# Patient Record
Sex: MALE | Race: BLACK OR AFRICAN AMERICAN | NOT HISPANIC OR LATINO | URBAN - METROPOLITAN AREA
[De-identification: names, ages, dates, MRNs, and addresses within clinical notes are randomized per-mention and may not be internally consistent; named-entity substitution may affect disease eponyms.]

---

## 2024-04-17 ENCOUNTER — HOSPITAL ENCOUNTER (EMERGENCY)
Facility: HOSPITAL | Age: 34
Discharge: HOME | End: 2024-04-17
Attending: EMERGENCY MEDICINE

## 2024-04-17 VITALS
WEIGHT: 159.38 LBS | HEIGHT: 71 IN | OXYGEN SATURATION: 97 % | DIASTOLIC BLOOD PRESSURE: 72 MMHG | BODY MASS INDEX: 22.31 KG/M2 | TEMPERATURE: 98 F | SYSTOLIC BLOOD PRESSURE: 105 MMHG | RESPIRATION RATE: 18 BRPM | HEART RATE: 87 BPM

## 2024-04-17 DIAGNOSIS — N30.01 ACUTE CYSTITIS WITH HEMATURIA: Primary | ICD-10-CM

## 2024-04-17 LAB
BACTERIA #/AREA URNS HPF: ABNORMAL /HPF
BILIRUB UR QL STRIP: NEGATIVE
CLARITY UR: ABNORMAL
COLOR UR: YELLOW
GLUCOSE UR QL STRIP: NEGATIVE
HGB UR QL STRIP: NEGATIVE
KETONES UR QL STRIP: NEGATIVE
LEUKOCYTE ESTERASE UR QL STRIP: ABNORMAL
MICROSCOPIC COMMENT: ABNORMAL
NITRITE UR QL STRIP: POSITIVE
PH UR STRIP: 6 [PH] (ref 5–8)
PROT UR QL STRIP: ABNORMAL
RBC #/AREA URNS HPF: 2 /HPF (ref 0–4)
SP GR UR STRIP: 1.03 (ref 1–1.03)
URN SPEC COLLECT METH UR: ABNORMAL
UROBILINOGEN UR STRIP-ACNC: ABNORMAL EU/DL
WBC #/AREA URNS HPF: >100 /HPF (ref 0–5)
WBC CLUMPS URNS QL MICRO: ABNORMAL

## 2024-04-17 PROCEDURE — 87491 CHLMYD TRACH DNA AMP PROBE: CPT | Performed by: EMERGENCY MEDICINE

## 2024-04-17 PROCEDURE — 81000 URINALYSIS NONAUTO W/SCOPE: CPT | Performed by: EMERGENCY MEDICINE

## 2024-04-17 PROCEDURE — 87086 URINE CULTURE/COLONY COUNT: CPT | Performed by: EMERGENCY MEDICINE

## 2024-04-17 PROCEDURE — 87186 SC STD MICRODIL/AGAR DIL: CPT | Performed by: EMERGENCY MEDICINE

## 2024-04-17 PROCEDURE — 99283 EMERGENCY DEPT VISIT LOW MDM: CPT

## 2024-04-17 PROCEDURE — 87077 CULTURE AEROBIC IDENTIFY: CPT | Performed by: EMERGENCY MEDICINE

## 2024-04-17 PROCEDURE — 87088 URINE BACTERIA CULTURE: CPT | Performed by: EMERGENCY MEDICINE

## 2024-04-17 PROCEDURE — 25000003 PHARM REV CODE 250: Performed by: NURSE PRACTITIONER

## 2024-04-17 RX ORDER — CEPHALEXIN 500 MG/1
500 CAPSULE ORAL
Status: COMPLETED | OUTPATIENT
Start: 2024-04-17 | End: 2024-04-17

## 2024-04-17 RX ORDER — CEPHALEXIN 500 MG/1
500 CAPSULE ORAL 4 TIMES DAILY
Qty: 20 CAPSULE | Refills: 0 | Status: SHIPPED | OUTPATIENT
Start: 2024-04-17 | End: 2024-04-22

## 2024-04-17 RX ADMIN — CEPHALEXIN 500 MG: 500 CAPSULE ORAL at 09:04

## 2024-04-17 NOTE — Clinical Note
"Immanuel Bellay" Sheryl was seen and treated in our emergency department on 4/17/2024.  He may return to work on 04/19/2024.       If you have any questions or concerns, please don't hesitate to call.      Brandon Mejias NP"

## 2024-04-18 NOTE — FIRST PROVIDER EVALUATION
"Medical screening examination initiated.  I have conducted a focused provider triage encounter, findings are as follows:    Brief history of present illness:  Patient complains of foul-smelling urine for a month    Vitals:    04/17/24 2023   BP: 105/72   BP Location: Right arm   Patient Position: Sitting   Pulse: 87   Resp: 18   Temp: 97.8 °F (36.6 °C)   TempSrc: Oral   SpO2: 97%   Weight: 72.3 kg (159 lb 6.3 oz)   Height: 5' 11" (1.803 m)       Pertinent physical exam:  NAD    Brief workup plan:  UA    Preliminary workup initiated; this workup will be continued and followed by the physician or advanced practice provider that is assigned to the patient when roomed.  "

## 2024-04-19 LAB
C TRACH DNA SPEC QL NAA+PROBE: NOT DETECTED
N GONORRHOEA DNA SPEC QL NAA+PROBE: NOT DETECTED

## 2024-04-20 NOTE — ED PROVIDER NOTES
Encounter Date: 4/17/2024       History     Chief Complaint   Patient presents with    Urinary Frequency     Urinary urgency frequency x months, odorous cloudy urine, bilateral flank pain, denies blood in urine, denies penile discharge. Denies burning. Denies hx of prostate issues, states most of the time able to have strong steady stream of urine but at times has to bear down with intermittent stream of urine. Unable to keep erection with immediate urinary urgency. nasal and allergy congestion x4 days, unrelieved by otc allergy medications.      Patient reports cloudy foul-smelling urine        Review of patient's allergies indicates:   Allergen Reactions    Diphenhydramine hcl Palpitations and Other (See Comments)     Bradycardia and syncope     No past medical history on file.  No past surgical history on file.  No family history on file.     Review of Systems   Constitutional:  Negative for fever.   HENT:  Negative for sore throat.    Respiratory:  Negative for shortness of breath.    Cardiovascular:  Negative for chest pain.   Gastrointestinal:  Negative for nausea.   Genitourinary:  Negative for dysuria.   Musculoskeletal:  Negative for back pain.   Skin:  Negative for rash.   Neurological:  Negative for weakness.   Hematological:  Does not bruise/bleed easily.       Physical Exam     Initial Vitals [04/17/24 2023]   BP Pulse Resp Temp SpO2   105/72 87 18 97.8 °F (36.6 °C) 97 %      MAP       --         Physical Exam    Nursing note and vitals reviewed.  Constitutional: He appears well-developed and well-nourished.   HENT:   Head: Normocephalic and atraumatic.   Eyes: Conjunctivae are normal. Pupils are equal, round, and reactive to light.   Neck: Neck supple.   Normal range of motion.  Cardiovascular:  Normal rate, regular rhythm, normal heart sounds and intact distal pulses.           Pulmonary/Chest: Breath sounds normal.   Abdominal: Abdomen is soft. There is no rebound and no guarding.   Musculoskeletal:          General: Normal range of motion.      Cervical back: Normal range of motion and neck supple.     Neurological: He is alert.   Skin: Skin is warm and dry.   Psychiatric: He has a normal mood and affect. His behavior is normal. Thought content normal.         ED Course   Procedures  Labs Reviewed   CULTURE, URINE - Abnormal; Notable for the following components:       Result Value    Urine Culture, Routine   (*)     Value: PRESUMPTIVE E COLI  >100,000 cfu/ml  Identification and susceptibility pending      All other components within normal limits    Narrative:     Specimen Source->Urine   URINALYSIS, REFLEX TO URINE CULTURE - Abnormal; Notable for the following components:    Appearance, UA Hazy (*)     Protein, UA Trace (*)     Nitrite, UA Positive (*)     Urobilinogen, UA 2.0-3.0 (*)     Leukocytes, UA 3+ (*)     All other components within normal limits    Narrative:     Specimen Source->Urine   URINALYSIS MICROSCOPIC - Abnormal; Notable for the following components:    WBC, UA >100 (*)     WBC Clumps, UA Many (*)     Bacteria Many (*)     All other components within normal limits    Narrative:     Specimen Source->Urine   C. TRACHOMATIS/N. GONORRHOEAE BY AMP DNA   C. TRACHOMATIS/N. GONORRHOEAE BY AMP DNA    Narrative:     Specimen Source->Urine          Imaging Results    None          Medications   cephALEXin capsule 500 mg (500 mg Oral Given 4/17/24 2144)     Medical Decision Making  Risk  Prescription drug management.                                      Clinical Impression:  Final diagnoses:  [N30.01] Acute cystitis with hematuria (Primary)          ED Disposition Condition    Discharge Stable          ED Prescriptions       Medication Sig Dispense Start Date End Date Auth. Provider    cephALEXin (KEFLEX) 500 MG capsule Take 1 capsule (500 mg total) by mouth 4 (four) times daily. for 5 days 20 capsule 4/17/2024 4/22/2024 Brandon Mejias NP          Follow-up Information       Follow up With  Specialties Details Why Contact Info    pcp  Schedule an appointment as soon as possible for a visit  As needed              Brandon Mejias NP  04/20/24 1300

## 2024-04-22 LAB — BACTERIA UR CULT: ABNORMAL
